# Patient Record
Sex: MALE | Race: WHITE | URBAN - METROPOLITAN AREA
[De-identification: names, ages, dates, MRNs, and addresses within clinical notes are randomized per-mention and may not be internally consistent; named-entity substitution may affect disease eponyms.]

---

## 2019-12-16 ENCOUNTER — OFFICE VISIT (OUTPATIENT)
Dept: URGENT CARE | Age: 54
End: 2019-12-16

## 2019-12-16 VITALS
WEIGHT: 241.9 LBS | HEIGHT: 69 IN | OXYGEN SATURATION: 98 % | RESPIRATION RATE: 18 BRPM | DIASTOLIC BLOOD PRESSURE: 79 MMHG | BODY MASS INDEX: 35.83 KG/M2 | TEMPERATURE: 98.7 F | SYSTOLIC BLOOD PRESSURE: 138 MMHG | HEART RATE: 69 BPM

## 2019-12-16 DIAGNOSIS — J11.1 INFLUENZA: Primary | ICD-10-CM

## 2019-12-16 DIAGNOSIS — R50.81 FEVER IN OTHER DISEASES: ICD-10-CM

## 2019-12-16 LAB
FLUAV+FLUBV AG NOSE QL IA.RAPID: NEGATIVE POS/NEG
FLUAV+FLUBV AG NOSE QL IA.RAPID: NEGATIVE POS/NEG
VALID INTERNAL CONTROL?: YES

## 2019-12-16 RX ORDER — OSELTAMIVIR PHOSPHATE 75 MG/1
75 CAPSULE ORAL 2 TIMES DAILY
Qty: 10 CAP | Refills: 0 | Status: SHIPPED | OUTPATIENT
Start: 2019-12-16 | End: 2019-12-21

## 2019-12-16 NOTE — PATIENT INSTRUCTIONS
Return to urgent care if not making some improvement over next 7 days sooner/immediately for new or worsening Go to emergency department for any new, worsening or changes Influenza (Flu): Care Instructions Your Care Instructions Influenza (flu) is an infection in the lungs and breathing passages. It is caused by the influenza virus. There are different strains, or types, of the flu virus from year to year. Unlike the common cold, the flu comes on suddenly and the symptoms, such as a cough, congestion, fever, chills, fatigue, aches, and pains, are more severe. These symptoms may last up to 10 days. Although the flu can make you feel very sick, it usually doesn't cause serious health problems. Home treatment is usually all you need for flu symptoms. But your doctor may prescribe antiviral medicine to prevent other health problems, such as pneumonia, from developing. Older people and those who have a long-term health condition, such as lung disease, are most at risk for having pneumonia or other health problems. Follow-up care is a key part of your treatment and safety. Be sure to make and go to all appointments, and call your doctor if you are having problems. It's also a good idea to know your test results and keep a list of the medicines you take. How can you care for yourself at home? · Get plenty of rest. 
· Drink plenty of fluids, enough so that your urine is light yellow or clear like water. If you have kidney, heart, or liver disease and have to limit fluids, talk with your doctor before you increase the amount of fluids you drink. · Take an over-the-counter pain medicine if needed, such as acetaminophen (Tylenol), ibuprofen (Advil, Motrin), or naproxen (Aleve), to relieve fever, headache, and muscle aches. Read and follow all instructions on the label. No one younger than 20 should take aspirin. It has been linked to Reye syndrome, a serious illness. · Do not smoke. Smoking can make the flu worse. If you need help quitting, talk to your doctor about stop-smoking programs and medicines. These can increase your chances of quitting for good. · Breathe moist air from a hot shower or from a sink filled with hot water to help clear a stuffy nose. · Before you use cough and cold medicines, check the label. These medicines may not be safe for young children or for people with certain health problems. · If the skin around your nose and lips becomes sore, put some petroleum jelly on the area. · To ease coughing: ? Drink fluids to soothe a scratchy throat. ? Suck on cough drops or plain hard candy. ? Take an over-the-counter cough medicine that contains dextromethorphan to help you get some sleep. Read and follow all instructions on the label. ? Raise your head at night with an extra pillow. This may help you rest if coughing keeps you awake. · Take any prescribed medicine exactly as directed. Call your doctor if you think you are having a problem with your medicine. To avoid spreading the flu · Wash your hands regularly, and keep your hands away from your face. · Stay home from school, work, and other public places until you are feeling better and your fever has been gone for at least 24 hours. The fever needs to have gone away on its own without the help of medicine. · Ask people living with you to talk to their doctors about preventing the flu. They may get antiviral medicine to keep from getting the flu from you. · To prevent the flu in the future, get a flu vaccine every fall. Encourage people living with you to get the vaccine. · Cover your mouth when you cough or sneeze. When should you call for help? Call 911 anytime you think you may need emergency care. For example, call if: 
  · You have severe trouble breathing.  
 Call your doctor now or seek immediate medical care if: 
  · You have new or worse trouble breathing.   · You seem to be getting much sicker.  
  · You feel very sleepy or confused.  
  · You have a new or higher fever.  
  · You get a new rash.  
 Watch closely for changes in your health, and be sure to contact your doctor if: 
  · You begin to get better and then get worse.  
  · You are not getting better after 1 week. Where can you learn more? Go to http://mark-eitan.info/. Enter V212 in the search box to learn more about \"Influenza (Flu): Care Instructions. \" Current as of: June 9, 2019 Content Version: 12.2 © 7373-7016 [employer]. Care instructions adapted under license by [employer] (which disclaims liability or warranty for this information). If you have questions about a medical condition or this instruction, always ask your healthcare professional. [address] any warranty or liability for your use of this information.

## 2019-12-16 NOTE — PROGRESS NOTES
47 yo male with hx of HIV here Monica Cheney present complaining of flu-like symptoms: fevers, chills, myalgias, dry cough, nasal congestion, runny nose. Symptom onset 1 days ago without any preceding illness. Has not had any relief with OTC meds. No aggravating or alleviating factors. Symptoms are constant and overall worsening. Promotes no decrease in PO intake of fluids. Denies: severe lethargy, SOB, syncope/near syncope, vomiting/diarrhea, chest pain, chest pain with breathing, labored breathing, severe headache, non-intractable fevers . Hx significant for: kaposis sarcoma, HIV  Patient is requesting antibiotic today because: \"he doesn't want to be sick for [place]\"             Past Medical History:   Diagnosis Date    [facility]         History reviewed. No pertinent surgical history.       Family History   Problem Relation Age of Onset    No Known Problems Mother     Cancer Father         Social History     Socioeconomic History    Marital status: SINGLE     Spouse name: Not on file    Number of children: Not on file    Years of education: Not on file    Highest education level: Not on file   Occupational History    Not on file   Social Needs    Financial resource strain: Not on file    Food insecurity:     Worry: Not on file     Inability: Not on file    Transportation needs:     Medical: Not on file     Non-medical: Not on file   Tobacco Use    Smoking status: Never Smoker    Smokeless tobacco: Never Used   Substance and Sexual Activity    Alcohol use: Not on file    Drug use: Not on file    Sexual activity: Not on file   Lifestyle    Physical activity:     Days per week: Not on file     Minutes per session: Not on file    Stress: Not on file   Relationships    Social connections:     Talks on phone: Not on file     Gets together: Not on file     Attends Christian service: Not on file     Active member of club or organization: Not on file Attends meetings of clubs or organizations: Not on file     Relationship status: Not on file    Intimate partner violence:     Fear of current or ex partner: Not on file     Emotionally abused: Not on file     Physically abused: Not on file     Forced sexual activity: Not on file   Other Topics Concern    Not on file   Social History Narrative    Not on file                ALLERGIES: Patient has no known allergies. Review of Systems    Vitals:    12/16/19 1339   BP: 138/79   Pulse: 69   Resp: 18   Temp: 98.7 °F (37.1 °C)   SpO2: 98%   Weight: 241 lb 14.4 oz (109.7 kg)   Height: 5' 9\" (1.753 m)       Physical Exam  Vitals signs and nursing note reviewed. Constitutional:       General: He is not in acute distress. Appearance: Normal appearance. He is not ill-appearing, toxic-appearing or diaphoretic. Comments: Non-toxic appearing, well hydrated   HENT:      Head: Atraumatic. Comments:     TMs normal appearing bilat  Erythematous nasal turbinates with clear rhinorrhea and active nasal sniffling. OP mild erythema without swelling or exudate. Uvula midline. No oral lesions. Right Ear: Tympanic membrane normal. There is no impacted cerumen. Left Ear: Tympanic membrane normal. There is no impacted cerumen. Mouth/Throat:      Mouth: Mucous membranes are moist.      Pharynx: No oropharyngeal exudate. Eyes:      General: No scleral icterus. Conjunctiva/sclera: Conjunctivae normal.      Pupils: Pupils are equal, round, and reactive to light. Neck:      Musculoskeletal: Normal range of motion and neck supple. No muscular tenderness. Cardiovascular:      Rate and Rhythm: Normal rate and regular rhythm. Pulses: Normal pulses. Heart sounds: Normal heart sounds. No murmur. No friction rub. No gallop. Pulmonary:      Effort: Pulmonary effort is normal. No respiratory distress. Breath sounds: No stridor. No wheezing, rhonchi or rales.       Comments: Good air movement throughout all lung fields bilat. Lymphadenopathy:      Cervical: No cervical adenopathy. Skin:     General: Skin is warm and dry. Capillary Refill: Capillary refill takes less than 2 seconds. Coloration: Skin is not pale. Findings: No erythema or rash. Neurological:      General: No focal deficit present. Mental Status: He is alert and oriented to person, place, and time. Psychiatric:         Behavior: Behavior normal.         Thought Content: Thought content normal.         Judgment: Judgment normal.         MDM     Differential Diagnosis; Clinical Impression; Plan:       CLINICAL IMPRESSION:  (J11.1) Influenza  (primary encounter diagnosis)  (R50.81) Fever in other diseases    Orders Placed This Encounter      oseltamivir (TAMIFLU) 75 mg capsule          Sig: Take 1 Cap by mouth two (2) times a day for 5 days. Dispense:  10 Cap          Refill:  0      Plan:  Suspect possible flu although rapid test was negative in office  He is at risk for flu complications and have advised that he start tamiflu. Today on exam there is no suggestion or evidence of a secondary bacterial process: lungs clear, HR normal, Sp02 98%, afebrile in office. Educated this is likely a viral process and antibiotics not indicated at this time. Maintain adequate fluid intake  Review handouts   For for any new, worsening or changes have advised re evaluation here at urgent care or the emergency department. We have reviewed concerning signs/symptoms, normal vs abnormal progression of medical condition and when to seek immediate medical attention. See your PCP if there is not at least some improvement in symptoms within the next 1 week  You should see your PCP for updates on your routine health maintenance.              Procedures